# Patient Record
Sex: FEMALE | Race: WHITE | NOT HISPANIC OR LATINO | Employment: UNEMPLOYED | URBAN - METROPOLITAN AREA
[De-identification: names, ages, dates, MRNs, and addresses within clinical notes are randomized per-mention and may not be internally consistent; named-entity substitution may affect disease eponyms.]

---

## 2019-01-06 ENCOUNTER — HOSPITAL ENCOUNTER (EMERGENCY)
Facility: HOSPITAL | Age: 49
Discharge: HOME | End: 2019-01-06
Attending: EMERGENCY MEDICINE | Admitting: EMERGENCY MEDICINE

## 2019-01-06 ENCOUNTER — APPOINTMENT (OUTPATIENT)
Dept: GENERAL RADIOLOGY | Facility: HOSPITAL | Age: 49
End: 2019-01-06

## 2019-01-06 VITALS
BODY MASS INDEX: 38.89 KG/M2 | HEIGHT: 61 IN | OXYGEN SATURATION: 93 % | TEMPERATURE: 98.6 F | WEIGHT: 206 LBS | RESPIRATION RATE: 18 BRPM | HEART RATE: 83 BPM | DIASTOLIC BLOOD PRESSURE: 67 MMHG | SYSTOLIC BLOOD PRESSURE: 123 MMHG

## 2019-01-06 DIAGNOSIS — S29.019A THORACIC MYOFASCIAL STRAIN, INITIAL ENCOUNTER: Primary | ICD-10-CM

## 2019-01-06 LAB
BILIRUB UR QL STRIP: NEGATIVE
CLARITY UR: CLEAR
COLOR UR: YELLOW
D DIMER PPP FEU-MCNC: 0.33 MG/L (FEU) (ref 0–0.5)
GLUCOSE UR STRIP-MCNC: NEGATIVE MG/DL
HGB UR QL STRIP.AUTO: NEGATIVE
KETONES UR QL STRIP: NEGATIVE
LEUKOCYTE ESTERASE UR QL STRIP.AUTO: NEGATIVE
NITRITE UR QL STRIP: NEGATIVE
PH UR STRIP.AUTO: 6 [PH] (ref 5–8)
PROT UR QL STRIP: NEGATIVE
SP GR UR STRIP: 1.02 (ref 1–1.03)
TROPONIN I SERPL-MCNC: 0 NG/ML (ref 0–0.07)
UROBILINOGEN UR QL STRIP: NORMAL

## 2019-01-06 PROCEDURE — 85379 FIBRIN DEGRADATION QUANT: CPT | Performed by: EMERGENCY MEDICINE

## 2019-01-06 PROCEDURE — 99284 EMERGENCY DEPT VISIT MOD MDM: CPT

## 2019-01-06 PROCEDURE — 93005 ELECTROCARDIOGRAM TRACING: CPT | Performed by: EMERGENCY MEDICINE

## 2019-01-06 PROCEDURE — 71046 X-RAY EXAM CHEST 2 VIEWS: CPT

## 2019-01-06 PROCEDURE — 36415 COLL VENOUS BLD VENIPUNCTURE: CPT

## 2019-01-06 PROCEDURE — 84484 ASSAY OF TROPONIN QUANT: CPT

## 2019-01-06 PROCEDURE — 81003 URINALYSIS AUTO W/O SCOPE: CPT

## 2019-01-06 RX ORDER — SODIUM CHLORIDE 0.9 % (FLUSH) 0.9 %
10 SYRINGE (ML) INJECTION AS NEEDED
Status: DISCONTINUED | OUTPATIENT
Start: 2019-01-06 | End: 2019-01-06 | Stop reason: HOSPADM

## 2019-01-06 RX ORDER — NAPROXEN 500 MG/1
500 TABLET ORAL 2 TIMES DAILY WITH MEALS
Qty: 10 TABLET | Refills: 0 | Status: SHIPPED | OUTPATIENT
Start: 2019-01-06

## 2019-01-06 RX ORDER — CYCLOBENZAPRINE HCL 10 MG
10 TABLET ORAL 3 TIMES DAILY PRN
Qty: 15 TABLET | Refills: 0 | Status: SHIPPED | OUTPATIENT
Start: 2019-01-06

## 2019-01-06 RX ORDER — NAPROXEN 500 MG/1
500 TABLET ORAL ONCE
Status: COMPLETED | OUTPATIENT
Start: 2019-01-06 | End: 2019-01-06

## 2019-01-06 RX ORDER — CYCLOBENZAPRINE HCL 10 MG
10 TABLET ORAL ONCE
Status: COMPLETED | OUTPATIENT
Start: 2019-01-06 | End: 2019-01-06

## 2019-01-06 RX ADMIN — NAPROXEN 500 MG: 500 TABLET ORAL at 17:55

## 2019-01-06 RX ADMIN — CYCLOBENZAPRINE HYDROCHLORIDE 10 MG: 10 TABLET, FILM COATED ORAL at 17:54

## 2019-01-06 NOTE — ED PROVIDER NOTES
Subjective   Marina Celestin is a 48 y.o.female who presents to the ED with complaints right sided lower back pain. The patient reports her pain suddenly onset while she was vacuuming three days ago. She states her pain worsens when she moves. She has not taken any medication for her discomfort. She denies any shortness of breath, vomiting, or rash. Additionally, she denies any injury to cause her presenting discomfort. There are no other complaints at this time.         History provided by:  Patient  Back Pain   Location:  Lumbar spine (right sided)  Quality:  Aching  Radiates to:  Does not radiate  Pain severity:  Mild  Pain is:  Same all the time  Onset quality:  Sudden  Duration:  4 days  Timing:  Constant  Progression:  Waxing and waning  Chronicity:  New  Context: not recent injury    Relieved by:  None tried  Worsened by:  Movement  Ineffective treatments:  None tried      Review of Systems   Respiratory: Negative for shortness of breath.    Gastrointestinal: Negative for vomiting.   Musculoskeletal: Positive for back pain.   Skin: Negative for rash.   All other systems reviewed and are negative.      Past Medical History:   Diagnosis Date   • Vaginal bleeding, abnormal        Allergies   Allergen Reactions   • Codeine Other (See Comments)     Headache       Past Surgical History:   Procedure Laterality Date   • CHOLECYSTECTOMY     • ENDOMETRIAL ABLATION     • LAPAROSCOPIC ASSISTED VAGINAL HYSTERECTOMY N/A 8/24/2016    Procedure: LAPAROSCOPIC ASSISTED VAGINAL HYSTERECTOMY;  Surgeon: Denice Hogan MD;  Location: [facility];  Service:    • LAPAROSCOPIC TUBAL LIGATION     • MYOMECTOMY     • TONSILLECTOMY     • WISDOM TOOTH EXTRACTION         History reviewed. No pertinent family history.    Social History     Socioeconomic History   • Marital status:      Spouse name: Not on file   • Number of children: Not on file   • Years of education: Not on file   • Highest education level: Not on file    Tobacco Use   • Smoking status: Never Smoker   • Smokeless tobacco: Never Used   Substance and Sexual Activity   • Alcohol use: No   • Drug use: No         Objective   Physical Exam   Constitutional: She is oriented to person, place, and time. She appears well-developed and well-nourished. No distress.   HENT:   Head: Normocephalic and atraumatic.   Nose: Nose normal.   Eyes: Conjunctivae are normal. No scleral icterus.   Neck: Normal range of motion. Neck supple.   Cardiovascular: Normal rate, regular rhythm, normal heart sounds and intact distal pulses.   No murmur heard.  Pulmonary/Chest: Effort normal and breath sounds normal. No respiratory distress.   Abdominal: Soft. Bowel sounds are normal. There is no tenderness.   Musculoskeletal: Normal range of motion. She exhibits tenderness. She exhibits no edema.   Tenderness to left infrascapular border.    Neurological: She is alert and oriented to person, place, and time.   Skin: Skin is warm and dry.   Psychiatric: She has a normal mood and affect. Her behavior is normal.   Nursing note and vitals reviewed.      Procedures         ED Course  ED Course as of Jan 06 2035   Sun Jan 06, 2019 2029 Pt with no other complaints or other concerns.   [JI]   2034 Pt has tenderness that is reproducible on exam. Pt says the pain is worse with twisting.   [JI]      ED Course User Index  [JI] Miguel Johansen PA     Recent Results (from the past 24 hour(s))   Urinalysis With Microscopic If Indicated (No Culture) - Urine, Clean Catch    Collection Time: 01/06/19  3:47 PM   Result Value Ref Range    Color, UA Yellow Yellow, Straw    Appearance, UA Clear Clear    pH, UA 6.0 5.0 - 8.0    Specific Gravity, UA 1.020 1.001 - 1.030    Glucose, UA Negative Negative    Ketones, UA Negative Negative    Bilirubin, UA Negative Negative    Blood, UA Negative Negative    Protein, UA Negative Negative    Leuk Esterase, UA Negative Negative    Nitrite, UA Negative Negative    Urobilinogen, UA  "1.0 E.U./dL 0.2 - 1.0 E.U./dL   D-dimer, Quantitative    Collection Time: 01/06/19  5:25 PM   Result Value Ref Range    D-Dimer, Quantitative 0.33 0.00 - 0.50 mg/L (FEU)   POC Troponin, Rapid    Collection Time: 01/06/19  5:31 PM   Result Value Ref Range    Troponin I 0.00 0.00 - 0.07 ng/mL     Note: In addition to lab results from this visit, the labs listed above may include labs taken at another facility or during a different encounter within the last 24 hours. Please correlate lab times with ED admission and discharge times for further clarification of the services performed during this visit.    XR Chest PA & Lateral   Final Result   1. No acute disease process is seen in the chest.    2. Other findings as above.       THIS DOCUMENT HAS BEEN ELECTRONICALLY SIGNED BY ELENA VASQUEZ MD        Vitals:    01/06/19 1539   BP: 136/70   BP Location: Left arm   Patient Position: Sitting   Pulse: 94   Resp: 16   Temp: 98.6 °F (37 °C)   TempSrc: Oral   SpO2: 96%   Weight: 93.4 kg (206 lb)   Height: 154.9 cm (61\")     Medications   sodium chloride 0.9 % flush 10 mL (not administered)   naproxen (NAPROSYN) tablet 500 mg (500 mg Oral Given 1/6/19 1755)   cyclobenzaprine (FLEXERIL) tablet 10 mg (10 mg Oral Given 1/6/19 1754)     ECG/EMG Results (last 24 hours)     ** No results found for the last 24 hours. **          Recent Results (from the past 24 hour(s))   Urinalysis With Microscopic If Indicated (No Culture) - Urine, Clean Catch    Collection Time: 01/06/19  3:47 PM   Result Value Ref Range    Color, UA Yellow Yellow, Straw    Appearance, UA Clear Clear    pH, UA 6.0 5.0 - 8.0    Specific Gravity, UA 1.020 1.001 - 1.030    Glucose, UA Negative Negative    Ketones, UA Negative Negative    Bilirubin, UA Negative Negative    Blood, UA Negative Negative    Protein, UA Negative Negative    Leuk Esterase, UA Negative Negative    Nitrite, UA Negative Negative    Urobilinogen, UA 1.0 E.U./dL 0.2 - 1.0 E.U./dL   D-dimer, " "Quantitative    Collection Time: 01/06/19  5:25 PM   Result Value Ref Range    D-Dimer, Quantitative 0.33 0.00 - 0.50 mg/L (FEU)   POC Troponin, Rapid    Collection Time: 01/06/19  5:31 PM   Result Value Ref Range    Troponin I 0.00 0.00 - 0.07 ng/mL     Note: In addition to lab results from this visit, the labs listed above may include labs taken at another facility or during a different encounter within the last 24 hours. Please correlate lab times with ED admission and discharge times for further clarification of the services performed during this visit.    XR Chest PA & Lateral   Final Result   1. No acute disease process is seen in the chest.    2. Other findings as above.       THIS DOCUMENT HAS BEEN ELECTRONICALLY SIGNED BY ELENA VASQUEZ MD        Vitals:    01/06/19 1539   BP: 136/70   BP Location: Left arm   Patient Position: Sitting   Pulse: 94   Resp: 16   Temp: 98.6 °F (37 °C)   TempSrc: Oral   SpO2: 96%   Weight: 93.4 kg (206 lb)   Height: 154.9 cm (61\")     Medications   sodium chloride 0.9 % flush 10 mL (not administered)   naproxen (NAPROSYN) tablet 500 mg (500 mg Oral Given 1/6/19 1755)   cyclobenzaprine (FLEXERIL) tablet 10 mg (10 mg Oral Given 1/6/19 1754)     ECG/EMG Results (last 24 hours)     Procedure Component Value Units Date/Time    ECG 12 Lead [[id number]] Collected:  01/06/19 1719     Updated:  01/06/19 1721                    MDM    Final diagnoses:   Thoracic myofascial strain, initial encounter       Documentation assistance provided by ira Hendrix.  Information recorded by the ira was done at my direction and has been verified and validated by me.     Marlon Hendrix  01/06/19 1711       Miguel Johansen PA  01/06/19 2035    "

## 2019-01-07 NOTE — DISCHARGE INSTRUCTIONS
Return if shortness of breath or symptoms worsens.  Follow up with your doctor this week. Follow up with the doctor listed above if you do not have a doctor.